# Patient Record
Sex: FEMALE | Race: WHITE | NOT HISPANIC OR LATINO
[De-identification: names, ages, dates, MRNs, and addresses within clinical notes are randomized per-mention and may not be internally consistent; named-entity substitution may affect disease eponyms.]

---

## 2023-05-15 ENCOUNTER — APPOINTMENT (OUTPATIENT)
Dept: ORTHOPEDIC SURGERY | Facility: CLINIC | Age: 23
End: 2023-05-15
Payer: COMMERCIAL

## 2023-05-15 VITALS — SYSTOLIC BLOOD PRESSURE: 121 MMHG | DIASTOLIC BLOOD PRESSURE: 79 MMHG | HEART RATE: 83 BPM

## 2023-05-15 VITALS — HEIGHT: 64 IN | WEIGHT: 130 LBS | BODY MASS INDEX: 22.2 KG/M2

## 2023-05-15 DIAGNOSIS — M25.561 PAIN IN RIGHT KNEE: ICD-10-CM

## 2023-05-15 PROCEDURE — 73562 X-RAY EXAM OF KNEE 3: CPT | Mod: RT

## 2023-05-15 PROCEDURE — 99203 OFFICE O/P NEW LOW 30 MIN: CPT

## 2023-05-15 NOTE — HISTORY OF PRESENT ILLNESS
[de-identified] : 23 year old female presents today with right knee pain x  2 months. No injury reported. The pain began after she started running 2 months ago. Patient states that she has been having knee trouble since she was young believe she has "knock knees" . Says that she has to have her foot pointed out when walking otherwise she has pain with walking. The pain is constant worse with exertion. She is not taking pain medication. She has been applying knee sleeve without relief. \par \par The patient's past medical history, past surgical history, medications and allergies were reviewed by me today with the patient and documented accordingly. In addition, the patient's family and social history, which were noncontributory to this visit, were reviewed also.

## 2023-05-15 NOTE — PHYSICAL EXAM
[de-identified] : Oriented to time, place, person\par Mood: Normal\par Affect: Normal\par Appearance: Healthy, well appearing, no acute distress.\par Gait: Normal\par Assistive Devices: None\par \par Skin: Clean, dry, intact\par Inspection: No obvious malalignment, no masses, no swelling, no effusion\par Pulses: 2+ DP/PT pulses\par ROM: 0-140 degrees of flexion. No pain with deep knee flexion/extension.\par Tenderness: No MJLT. No LJLT. . No pain to the quadriceps tendon.Mild lateral pain. Mild pain over the inferior pull of the patella. No posterior knee tenderness.\par Stability: Stable to varus, valgus. Negative lachman testing. Negative anterior drawer, negative posterior drawer.\par Strength: 5/5 Q/H/TA/GS/EHL, without atrophy\par Neuro: In tact to light touch throughout, DTR's normal\par Additional tests: Negative McMurrays test, Negative patellar grind test.\par  [de-identified] : The following radiographs were ordered and read by me during this patients visit. I reviewed each radiograph in detail with the patient and discussed the findings as highlighted below.\par \par 4 views of the Rightknee were obtained today, 05/15/2023  that show no acute fracture or dislocation. There is no medial, no lateral and no patellofemoral degenerative changes seen. There is no significant malalignment. No significant other obvious osseous abnormality, otherwise unremarkable.\par

## 2023-05-15 NOTE — ADDENDUM
[FreeTextEntry1] : I, Shanti Mejia, have acted as a scribe and documented the above information for Dr. Mar on 05/15/2023.\par

## 2023-05-15 NOTE — DISCUSSION/SUMMARY
[de-identified] : 23-year-old female with right knee pain\par \par The patient presents with clinical symptoms of diffuse anterior knee pain with dysfunction of the patellofemoral compartment. The patient's pain is likely multifactorial; including chondromalacia of the patellofemoral compartment, muscle weakness, limb malalignment, patella maltracking. The patient presents with the following findings on radiographic imaging; Normal.\par \par I discussed at length the following nonoperative modalities of treatment for anterior knee pain/patellofemoral syndrome with the patient that includes anti-inflammatory medication, activity modification, and physical therapy. Physical therapy will include vastus medialis strengthening, close chain short arc quadriceps exercises as well as hip and core strengthening. Surgical intervention is typically reserved for cases refractory to nonoperative management with evidence of malalignment that may include debridement, lateral release versus patellar realignment surgery.\par \par Recommendation; Observation of symptoms. Apply ice, OTC Motrin when symptoms worsen. Activities as tolerated.  Avoid patellofemoral loading.\par \par Follow up if symptoms worsen and will write a script for PT.

## 2024-02-05 ENCOUNTER — APPOINTMENT (OUTPATIENT)
Dept: ORTHOPEDIC SURGERY | Facility: CLINIC | Age: 24
End: 2024-02-05
Payer: COMMERCIAL

## 2024-02-05 VITALS — WEIGHT: 130 LBS | BODY MASS INDEX: 22.2 KG/M2 | HEIGHT: 64 IN

## 2024-02-05 DIAGNOSIS — M79.18 MYALGIA, OTHER SITE: ICD-10-CM

## 2024-02-05 DIAGNOSIS — M23.92 UNSPECIFIED INTERNAL DERANGEMENT OF LEFT KNEE: ICD-10-CM

## 2024-02-05 PROCEDURE — 73564 X-RAY EXAM KNEE 4 OR MORE: CPT | Mod: LT

## 2024-02-05 PROCEDURE — 99214 OFFICE O/P EST MOD 30 MIN: CPT | Mod: 25

## 2024-02-05 RX ORDER — NAPROXEN 500 MG/1
500 TABLET ORAL TWICE DAILY
Qty: 60 | Refills: 0 | Status: ACTIVE | COMMUNITY
Start: 2024-02-05 | End: 1900-01-01

## 2024-02-05 NOTE — HISTORY OF PRESENT ILLNESS
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 Has tried ice, actiivy mod

## 2024-02-05 NOTE — IMAGING
[de-identified] :  LEFT KNEE Inspection:  mild effusion Palpation: lateral joint line tenderness  Knee Range of Motion:  0-130  Strength: 5/5 Quadriceps strength, 5/5 Hamstring strength Neurological: light touch is intact throughout Ligament Stability and Special Tests:  McMurrays: Positive Lachman: neg Pivot Shift: neg Posterior Drawer: neg Valgus: neg Varus: neg Patella Apprehension: neg Patella Maltracking: neg

## 2024-02-06 ENCOUNTER — TRANSCRIPTION ENCOUNTER (OUTPATIENT)
Age: 24
End: 2024-02-06

## 2024-02-06 ENCOUNTER — APPOINTMENT (OUTPATIENT)
Dept: MRI IMAGING | Facility: CLINIC | Age: 24
End: 2024-02-06
Payer: COMMERCIAL

## 2024-02-06 PROCEDURE — 73721 MRI JNT OF LWR EXTRE W/O DYE: CPT | Mod: LT

## 2024-02-12 ENCOUNTER — APPOINTMENT (OUTPATIENT)
Dept: ORTHOPEDIC SURGERY | Facility: CLINIC | Age: 24
End: 2024-02-12
Payer: COMMERCIAL

## 2024-02-12 PROCEDURE — 27538 TREAT KNEE FRACTURE(S): CPT

## 2024-02-12 PROCEDURE — 99214 OFFICE O/P EST MOD 30 MIN: CPT | Mod: 25

## 2024-02-12 PROCEDURE — L1833: CPT | Mod: LT

## 2024-02-12 NOTE — ASSESSMENT
[FreeTextEntry1] :  mri left knee 2/6/24 - nondsiplaced avulsion injury tibial spine, acl sprain, post-medial capsular sprain  - The patient was advised of the diagnosis.  The natural history of the pathology was explained to the patient in layman's terms.  Several different treatment options were discussed and explained including the risks and benefits of both surgical and non-surgical treatments. - Due to risks of surgery, we will continue conservative treatment. - nonop fx care in brace - The patient was advised to apply ice (wrapped in a towel or protective covering) to the area daily (20 minutes at a time, 2-4X/day). - The patient was advised to modify their activities. - fu 6 week check if tender and consider advance activiyt with PT

## 2024-02-12 NOTE — HISTORY OF PRESENT ILLNESS
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 Has tried ice, actiivy mod  2/12/24 - had mri, still pain, mod activtiy

## 2024-02-12 NOTE — IMAGING
[de-identified] :  Constitutional:  The patient appears well developed, well nourished.   Skin: No impressive skin lesions present, except as noted in detailed exam.  Lymphatic: No palpable lymphadenopathy in examined body areas.  Neurologic:  Alert and oriented to time, place and person.    Vascular: Capillary refill is normal  LEFT KNEE Inspection:  mild effusion Palpation: medial and lateral joint line tenderness  Knee Range of Motion:  0-130  Strength: 5/5 Quadriceps strength, 5/5 Hamstring strength Neurological: light touch is intact throughout Ligament Stability and Special Tests:  McMurrays: Lachman: neg Pivot Shift: neg Posterior Drawer: neg Valgus: neg Varus: neg Patella Apprehension: neg Patella Maltracking: neg

## 2024-03-25 ENCOUNTER — APPOINTMENT (OUTPATIENT)
Dept: ORTHOPEDIC SURGERY | Facility: CLINIC | Age: 24
End: 2024-03-25
Payer: COMMERCIAL

## 2024-03-25 PROCEDURE — 99213 OFFICE O/P EST LOW 20 MIN: CPT | Mod: 24

## 2024-03-25 NOTE — HISTORY OF PRESENT ILLNESS
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 Has tried ice, actiivy mod  2/12/24 - had mri, still pain, mod activtiy 3/25/24- L knee improving with some continued discomfort with ADLs, wearing brace and cont mod activitiy

## 2024-03-25 NOTE — ASSESSMENT
[FreeTextEntry1] : mri left knee 2/6/24 - nondsiplaced avulsion injury tibial spine, acl sprain, post-medial capsular sprain  - The patient was advised of the diagnosis.  The natural history of the pathology was explained to the patient in layman's terms.  Several different treatment options were discussed and explained including the risks and benefits of both surgical and non-surgical treatments. - Due to risks of surgery, we will continue conservative treatment with PT, icing, and anti-inflammatory medications. - The patient was provided with a prescription for Physical Therapy. - The patient was advised to let pain guide the gradual advancement of activities. - fu 6 week re-eval

## 2024-03-25 NOTE — IMAGING
[de-identified] : Constitutional:  The patient appears well developed, well nourished.   Skin: No impressive skin lesions present, except as noted in detailed exam.  Lymphatic: No palpable lymphadenopathy in examined body areas.  Neurologic:  Alert and oriented to time, place and person.    Vascular: Capillary refill is normal   LEFT KNEE Inspection:  minimal effusion Palpation: medial facet of the patella tenderness  Knee Range of Motion:  0-130 Strength: 5/5 Quadriceps strength, 5/5 Hamstring strength, 4/5 Hip Abductor strength Neurological: light touch is intact throughout Ligament Stability and Special Tests:  McMurrays: neg Lachman: neg Pivot Shift: neg Posterior Drawer: neg Valgus: neg Varus: neg Patella Apprehension: neg Patella Maltracking: neg

## 2024-05-06 ENCOUNTER — APPOINTMENT (OUTPATIENT)
Dept: ORTHOPEDIC SURGERY | Facility: CLINIC | Age: 24
End: 2024-05-06
Payer: COMMERCIAL

## 2024-05-06 DIAGNOSIS — S82.115A NONDISPLACED FRACTURE OF LEFT TIBIAL SPINE, INITIAL ENCOUNTER FOR CLOSED FRACTURE: ICD-10-CM

## 2024-05-06 DIAGNOSIS — M22.2X2 PATELLOFEMORAL DISORDERS, LEFT KNEE: ICD-10-CM

## 2024-05-06 DIAGNOSIS — S83.512A SPRAIN OF ANTERIOR CRUCIATE LIGAMENT OF LEFT KNEE, INITIAL ENCOUNTER: ICD-10-CM

## 2024-05-06 PROCEDURE — 99213 OFFICE O/P EST LOW 20 MIN: CPT

## 2024-05-06 RX ORDER — MELOXICAM 15 MG/1
15 TABLET ORAL
Qty: 30 | Refills: 1 | Status: ACTIVE | COMMUNITY
Start: 2024-05-06 | End: 1900-01-01

## 2024-05-06 NOTE — HISTORY OF PRESENT ILLNESS
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 Has tried ice, actiivy mod  2/12/24 - had mri, still pain, mod activtiy 3/25/24- L knee improving with some continued discomfort with ADLs, wearing brace and cont mod activitiy 5/6/24- L knee improving, still complains of soreness, sent to PT (Prof Gonzalez)

## 2024-05-06 NOTE — ASSESSMENT
[FreeTextEntry1] : mri left knee 2/6/24 - nondsiplaced avulsion injury tibial spine, acl sprain, post-medial capsular sprain  - The patient was advised of the diagnosis.  The natural history of the pathology was explained to the patient in layman's terms.  Several different treatment options were discussed and explained including the risks and benefits of both surgical and non-surgical treatments. - Due to risks of surgery, we will continue conservative treatment with PT, icing, and anti-inflammatory medications. - The patient was provided with a prescription for Physical Therapy. - The patient was advised to let pain guide the gradual advancement of activities. - mobic rx - Patient was given a prescription for an anti-inflammatory medication.  They will take it for the next week and then on an as needed basis, as long as there are no medical contra-indications.  Patient is counseled on possible GI, renal, and cardiovascular side effects. - fu 6-8 week re-eval

## 2024-05-06 NOTE — IMAGING
[de-identified] : Constitutional:  The patient appears well developed, well nourished.   Skin: No impressive skin lesions present, except as noted in detailed exam.  Lymphatic: No palpable lymphadenopathy in examined body areas.  Neurologic:  Alert and oriented to time, place and person.    Vascular: Capillary refill is normal   LEFT KNEE Inspection:  minimal effusion Palpation: medial facet of the patella tenderness  Knee Range of Motion:  0-130 Strength: 5/5 Quadriceps strength, 5/5 Hamstring strength, 4/5 Hip Abductor strength Neurological: light touch is intact throughout Ligament Stability and Special Tests:  McMurrays: neg Lachman: neg Pivot Shift: neg Posterior Drawer: neg Valgus: neg Varus: neg Patella Apprehension: neg Patella Maltracking: neg

## 2024-06-24 ENCOUNTER — APPOINTMENT (OUTPATIENT)
Dept: ORTHOPEDIC SURGERY | Facility: CLINIC | Age: 24
End: 2024-06-24